# Patient Record
Sex: FEMALE | Race: ASIAN
[De-identification: names, ages, dates, MRNs, and addresses within clinical notes are randomized per-mention and may not be internally consistent; named-entity substitution may affect disease eponyms.]

---

## 2019-11-21 ENCOUNTER — HOSPITAL ENCOUNTER (EMERGENCY)
Dept: HOSPITAL 25 - UCEAST | Age: 59
Discharge: HOME | End: 2019-11-21
Payer: COMMERCIAL

## 2019-11-21 VITALS — DIASTOLIC BLOOD PRESSURE: 70 MMHG | SYSTOLIC BLOOD PRESSURE: 111 MMHG

## 2019-11-21 DIAGNOSIS — Z85.3: ICD-10-CM

## 2019-11-21 DIAGNOSIS — Z88.2: ICD-10-CM

## 2019-11-21 DIAGNOSIS — N30.00: Primary | ICD-10-CM

## 2019-11-21 PROCEDURE — G0463 HOSPITAL OUTPT CLINIC VISIT: HCPCS

## 2019-11-21 PROCEDURE — 87086 URINE CULTURE/COLONY COUNT: CPT

## 2019-11-21 PROCEDURE — 99212 OFFICE O/P EST SF 10 MIN: CPT

## 2019-11-21 PROCEDURE — 81003 URINALYSIS AUTO W/O SCOPE: CPT

## 2019-11-21 PROCEDURE — 87077 CULTURE AEROBIC IDENTIFY: CPT

## 2019-11-21 PROCEDURE — 87186 SC STD MICRODIL/AGAR DIL: CPT

## 2019-11-21 NOTE — UC
Complaint Female HPI





- HPI Summary


HPI Summary: 





60 yo femalebwith the onset of dysuria/urgency and frequency since last pm





hx UTIs





no back pain


no n/v








- History Of Current Complaint


Chief Complaint: UCGU


Stated Complaint: URINARY ISSUE


Time Seen by Provider: 11/21/19 08:36


Hx Obtained From: Patient


Hx Last Menstrual Period: 1.5 weeks ago


Onset/Duration: Gradual Onset, Lasting Hours


Timing: Intermittent, Lasting Seconds


Severity Initially: Moderate


Severity Currently: None


Pain Intensity: 5 - when voiding


Pain Scale Used: 0-10 Numeric


Character: Burning


Aggravating Factor(s): Urination


Alleviating Factor(s): Nothing


Associated Signs And Symptoms: Positive: Negative


Related Hx: Similar Episode/Dx as: - UTI





- Allergies/Home Medications


Allergies/Adverse Reactions: 


 Allergies











Allergy/AdvReac Type Severity Reaction Status Date / Time


 


Sulfa (Sulfonamide Allergy  Hives Verified 11/21/19 08:26





Antibiotics)     











Home Medications: 


 Home Medications





Estradiol/Progesterone [Bijuva 1 mg-100 mg Capsule] 1 each PO DAILY 11/21/19 [

History Confirmed 11/21/19]











PMH/Surg Hx/FS Hx/Imm Hx


Previously Healthy: Yes


Cancer History: Breast Cancer





- Surgical History


Surgical History: Yes


Surgery Procedure, Year, and Place: dermoid cyst removal from ovaries 

bilaterally, bilat mastectomy





- Family History


Known Family History: Positive: Hypertension





- Social History


Alcohol Use: Occasionally


Substance Use Type: None


Smoking Status (MU): Never Smoked Tobacco





Review of Systems


All Other Systems Reviewed And Are Negative: Yes


Constitutional: Positive: Negative


Skin: Positive: Negative


Eyes: Positive: Negative


ENT: Positive: Negative


Respiratory: Positive: Negative


Cardiovascular: Positive: Negative


Gastrointestinal: Positive: Negative


Genitourinary: Positive: Dysuria, Frequency, Urgency


Motor: Positive: Negative


Neurovascular: Positive: Negative


Musculoskeletal: Positive: Negative


Neurological: Positive: Negative


Psychological: Positive: Negative





Physical Exam


Triage Information Reviewed: Yes


Appearance: Well-Appearing, No Pain Distress, Well-Nourished


Vital Signs: 


 Initial Vital Signs











Temp  97.1 F   11/21/19 08:21


 


Pulse  61   11/21/19 08:21


 


Resp  18   11/21/19 08:21


 


BP  111/70   11/21/19 08:21


 


Pulse Ox  97   11/21/19 08:21











Vital Signs Reviewed: Yes


Eyes: Positive: Conjunctiva Clear


ENT: Positive: Hearing grossly normal.  Negative: Nasal congestion, Nasal 

drainage, Trismus, Muffled voice, Hoarse voice


Dental Exam: Normal


Neck: Positive: Supple, Nontender, No Lymphadenopathy


Respiratory: Positive: Lungs clear, Normal breath sounds, No respiratory 

distress, No accessory muscle use


Cardiovascular: Positive: RRR, No Murmur


Abdomen Description: Positive: Nontender.  Negative: CVA Tenderness (R), CVA 

Tenderness (L)


Bowel Sounds: Positive: Present


Musculoskeletal: Positive: ROM Intact, No Edema


Neurological Exam: Normal


Neurological: Positive: Alert


Psychological Exam: Normal





Diagnostics





- Laboratory


Lab Results: 





UA  ++ leuks, ++ RBCs





 Complaint Female Dx





- Differential Dx/Diagnosis


Provider Diagnosis: 


 Acute cystitis








Discharge ED





- Sign-Out/Discharge


Documenting (check all that apply): Patient Departure


All imaging exams completed and their final reports reviewed: No Studies





- Discharge Plan


Condition: Stable


Disposition: HOME


Prescriptions: 


Cephalexin CAP* [Keflex CAP*] 500 mg PO BID #10 cap


Patient Education Materials:  Urinary Tract Infection in Women (ED)


Referrals: 


Pantera Roy MD [Primary Care Provider] - 4 Days (if not better)





- Billing Disposition and Condition


Condition: STABLE


Disposition: Home

## 2019-11-21 NOTE — XMS REPORT
Continuity of Care Document (CCD)

 Created on:2019



Patient:Laine Peterson

Sex:Female

:1960

External Reference #:[id number]





Demographics







 Address  [address] [place] [address]

 

 Mobile Phone  [phone]

 

 Work Phone  [phone]

 

 Email Address  [email or web address]

 

 Preferred Language  en

 

 Marital Status  Declined to Specify/Unknown

 

 Confucianism Affiliation  Unknown

 

 Race  

 

 Ethnic Group  Declined to Specify/Unknown









Author







 Name  Lorene Christianson N.P. (transmitted by agent of provider Rita [place])

 

 Address  [facility], [place] [address]









Care Team Providers







 Name  Role  Phone

 

 Pantera Roy MD - Endocrinology,  Care Team Information   [phone]
[phone]



 Diabetes & Metabolism    

 

 Manuela Quiroga MD -  Care Team Information   [phone]



 Dermatology    









Problems







 Active Problems  Provider  Date

 

 Allergic rhinitis  Kathie Segovia MD  Onset: 2015

 

 Respiratory Conditions Due To Other Specified  Kathie Segovia MD  Onset: 



 External Agent    

 

 H/O: asbestos exposure  Kathie Segovia MD  Onset: 2015







Social History







 Type  Date  Description  Comments

 

 Birth Sex    Unknown  

 

 Cigarette Use    Quit 3 Years Ago  

 

 Tobacco Use  Start: Unknown End:  Former Cigarette Smoker  



   Unknown    

 

 Smoking Status  Reviewed: 10/28/19  Former Cigarette Smoker  

 

 ETOH Use    Occasionally consumes  



     alcohol  

 

 Tobacco Use  Start: Unknown End:  Patient is a former smoker  



   Unknown    

 

 Recreational Drug Use    Never Used Drugs  

 

 Tobacco Use  Start: Unknown  Light tobacco smoker (10 or  



     fewer cigarettes/day)  

 

 Exercise Type/Frequency    Exercises regularly  







Allergies, Adverse Reactions, Alerts







 Active Allergies  Reaction  Severity  Comments  Date

 

 Sulfa Antibiotics        2015







Medications







 Active Medications  SIG  Qnty  Indications  Ordering  Date



         Provider  

 

 Estradiol  Take one tab po  30tabs    Lorene Christianson,  10/28/2019



          1mg Tablets  daily      N.P.  



           

 

 Testosterone Cream  Apply to thigh      Lorene Christianson,  2019



 5MG  daily      N.P.  

 

 Intrarosa  insert one vaginal  28units    Lorene Christianson,  2019



          6.5mg  suppository nightly      N.P.  



 Insert          



           

 

 Progesterone  1 by mouth every  30caps    Lorene Christianson,  2019



 Micronized  day      N.P.  



           100mg          



 Capsules          



           

 

 Trimethoprim  1 tab by mouth prn      Pantera Roy MD  



             100mg  after intercourse        



 Tablets          



           

 

 Soolantra  once a day to      Junaid,  



          1% Cream  timo Roy MD  



           

 

 Ibuprofen  2 tabs by mouth      Unknown  



          200mg  every 6 hours as        



 Capsules  needed        



           









 History Medications









 Estradiol  apply one patch  1units    Lorene Christianson, N.P.  2019 -



       0.075mg/24HR  weekly        10/28/2019



 Patches Weekly          



           

 

 Climara  place one patch  4units    Lorene Christianson, N.P.  2019 -



     0.05mg/24HR Patches  td weekly        10/28/2019



 Weekly          

 

 Climara  Apply one patch  4units    Lorene Christianson, N.P.  2019 -



     0.0375mg/24HR  weekly        2019



 Patches Weekly          



           

 

 No Active Medications        Unknown  2019 -



           2019







Medications Administered in Office







 Medication  SIG  Qnty  Indications  Ordering Provider  Date

 

 Triamcinolone (Kenalog)        Summer Long PA-C  2019



                Injection          



           







Immunizations







 Description

 

 No Information Available







Vital Signs







 Date  Vital  Result  Comment

 

 10/28/2019  8:41am  Height  66 inches  5'6"









 Weight  153.00 lb  

 

 Heart Rate  72 /min  

 

 BP Systolic  96 mmHg  

 

 BP Diastolic  62 mmHg  

 

 O2 % BldC Oximetry  97 %  

 

 BMI (Body Mass Index)  24.7 kg/m2  

 

 Last Menstrual Period  [id number]  









 2019  9:19am  Height  66 inches  5'6"









 Weight  150.00 lb  

 

 Heart Rate  78 /min  

 

 BP Systolic  124 mmHg  

 

 BP Diastolic  71 mmHg  

 

 Body Temperature  96.6 F  

 

 Pain Level  3  

 

 BMI (Body Mass Index)  24.2 kg/m2  







Results







 Test  Date  Facility  Test  Result  H/L  Range  Note

 

 Laboratory test  2019  [facility]  Estradiol  <40 pg/mL      1



 finding    [address]          



     [place] [address] [phone]          

 

 Free Estradiol  2019  [facility]  Percent Free  2.3 %      2



     [address]  Estradiol        



     [place] [address] [phone]          









 Free Estradiol  0.35 pg/mL  Abnormal    3

 

 Sex Hormone Binding Globulin  35.6 nmol/L      4

 

 Total Estradiol  15 pg/mL      5









 Laboratory test finding  2019  [facility]  Progesterone  1.6 
ng/mL      6



     [address]          



     [place] [address] [phone]          









 TSH (Thyroid Stim Horm)  1.74 mcIU/mL  Normal  0.34-5.60  

 

 Estradiol, Confirmatory, S  16 pg/mL      7









 Laboratory test  2019  [facility]  Testosterone  < 10.00  
Normal  8-60  



 finding    [address]  Total  ng/dL      



     [place] [address] [phone]          









 Sex Hormone Binding Globulin  34 nmol/L      8









 1  Estradiols <40 pg/mL are sent to a reference lab for low



   range testing.



   Postmenopausal Females                  < 20



   Ovulating females:  by day in cycle relative to LH Peak



   Follicular phase   - 12                10-50



   -  4                



   Mid-cycle          -  1               120-375



   Luteal phase       +  2                



   +  6                



   + 12                

 

 2  Reference Range:



   Adult Females: 1.6 - 3.6

 

 3  Reference Range:



   Adult Females: 0.6 - 7.1

 

 4  Reference Range:



   >49y: 17.3 - 125.0



   Test Performed by:



   [facility]



   [address] [place], [place] [id number]

 

 5  Reference Range:



   Adult Females



   Follicular: 30 - 100



   Luteal: 70 - 300



   Postmenopausal: <15

 

 6  Female reference ranges for Progesterone:



   Follicular phase.......0.3 - 1.5 ng/ml



   Mid-luteal phase.......5.2 - 18.5 ng/ml



   Postmenopausal.........< 0.8 ng/ml



   Pregnant



   1st trimester.........4.7 - 50.0 ng/ml



   2nd trimester.........19.4 - 45.3 ng/ml

 

 7  -------------------REFERENCE VALUE--------------------------



   Premenopausal:  (E2 levels vary widely through the



   menstrual cycle.)



   Postmenopausal: <10



   -------------------ADDITIONAL INFORMATION-------------------



   This test was developed and its performance characteristics



   determined by [facility] in a manner consistent with CLIA



   requirements. This test has not been cleared or approved by



   the U.S. Food and Drug Administration.



   Test Performed by:



   [facility] - [address] [address]



   : Surendra Tolbert M.D. Ph.D.; CLIA# [id number]

 

 8  -------------------REFERENCE VALUE--------------------------



    (non-pregnant)



   Test Performed by:



   [facility] - [address] [address]







Procedures







 Date  Code  Description  Status

 

 2019  31951  Inject/Drain Joint/Bursa Major W/O US  Completed

 

 2018  51254909  Colonoscopy  Completed







Medical Devices







 Description

 

 No Information Available







Encounters







 Type  Date  Location  Provider  Dx  Diagnosis

 

 Office Visit  2019  [facility]  Teressa Hernandez MD  M25.511  Pain 
in right



   9:15a  at [facility]









 M75.41  Impingement syndrome of right shoulder









 Office Visit  2019  3:40p  [facility]  Lorene Christianson  R68.82  Decreased 
libido



     Clinic [facility]  N.P.    









 R10.2  Pelvic and perineal pain

 

 N95.1  Menopausal and female climacteric states









 Office Visit  2019  9:00a  [facility]  Teressa Hernandez  M25.511  
Pain in right



     at [facility]    shoulder









 M75.41  Impingement syndrome of right shoulder









 Office Visit  2019  9:30a  [facility]  Nurse Visit  Z85.3  Personal 
history of



     Clinic of [facility]    malignant neoplasm



           of breast

 

 Office Visit  2019 10:00a  [facility]  Lorene Christianson  N95.1  Menopausal 
and



     Clinic of [facility]  N.P.    female climacteric



           states









 G47.00  Insomnia, unspecified







Assessments







 Date  Code  Description  Provider

 

 10/28/2019  R68.82  Decreased libido  Lorene Christianson N.P.

 

 2019  M25.511  Pain in right shoulder  Teressa Hernandez MD

 

 2019  M75.41  Impingement syndrome of right shoulder  Teressa Hernandez MD

 

 2019  R68.82  Decreased libido  Lorene Christianson N.P.

 

 2019  R10.2  Pelvic and perineal pain  Lorene Christianson N.P.

 

 2019  N95.1  Menopausal and female climacteric states  Lorene Christianson N.P.

 

 2019  M25.511  Pain in right shoulder  Summer Long PA-C

 

 2019  M25.511  Pain in right shoulder  Teressa Hernandez MD

 

 2019  M75.41  Impingement syndrome of right shoulder  Teressa Hernandez MD

 

 2019  Z85.3  Personal history of malignant neoplasm  Nurse Visit [facility]  

 

 2019  N95.1  Menopausal and female climacteric states  Lorene Christianson N.P.

 

 2019  G47.00  Insomnia, unspecified  Lorene Christianson N.P.







Plan of Treatment

Future Appointment(s):2019  3:40 pm - Lorene Christianson N.P. at [facility]10/ - Lorene Christianson N.P.R68.82 Decreased libidoComments:D 
mannose - consider for UTI prophylaxisCheck testosterone



Functional Status







 Description

 

 No Information Available







Mental Status







 Description

 

 No Information Available







Referrals







 Description

 

 No Information Available

## 2019-12-27 ENCOUNTER — HOSPITAL ENCOUNTER (EMERGENCY)
Dept: HOSPITAL 25 - UCEAST | Age: 59
Discharge: HOME | End: 2019-12-27
Payer: COMMERCIAL

## 2019-12-27 VITALS — DIASTOLIC BLOOD PRESSURE: 75 MMHG | SYSTOLIC BLOOD PRESSURE: 130 MMHG

## 2019-12-27 DIAGNOSIS — Z88.2: ICD-10-CM

## 2019-12-27 DIAGNOSIS — N39.0: Primary | ICD-10-CM

## 2019-12-27 PROCEDURE — 87086 URINE CULTURE/COLONY COUNT: CPT

## 2019-12-27 PROCEDURE — 81003 URINALYSIS AUTO W/O SCOPE: CPT

## 2019-12-27 PROCEDURE — G0463 HOSPITAL OUTPT CLINIC VISIT: HCPCS

## 2019-12-27 PROCEDURE — 96372 THER/PROPH/DIAG INJ SC/IM: CPT

## 2019-12-27 PROCEDURE — 99212 OFFICE O/P EST SF 10 MIN: CPT

## 2019-12-27 NOTE — UC
Complaint Female HPI





- HPI Summary


HPI Summary: 





60 yo female presents with UTI symptoms. She tells me that about 1.5 hours ago 

she had a sudden onset of burning with urination, bladder pressure, and urinary 

frequency. She has had UTIs in the past and this feels the same, but has never 

had one come on so suddenly. She denies fever, chills, abdominal pain, n/v, 

flank pain, back pain. No vaginal bleeding or discharge. Has never had a kidney 

stone in the past





- History Of Current Complaint


Stated Complaint: BURNING URINATION


Time Seen by Provider: 12/27/19 21:01


Hx Obtained From: Patient


Hx Last Menstrual Period: 1.5 weeks ago


Onset/Duration: Sudden Onset


Severity Initially: Mild


Severity Currently: Mild


Pain Intensity: 3


Pain Scale Used: 0-10 Numeric





- Allergies/Home Medications


Allergies/Adverse Reactions: 


 Allergies











Allergy/AdvReac Type Severity Reaction Status Date / Time


 


Sulfa (Sulfonamide Allergy  Hives Verified 12/27/19 21:08





Antibiotics)     











Home Medications: 


 Home Medications





Vaginal Insert For Lubrication 1 dose VAGINAL DAILY 12/27/19 [History Confirmed 

12/27/19]











PMH/Surg Hx/FS Hx/Imm Hx





- Additional Past Medical History


Additional PMH: 





None





- Surgical History


Surgical History: Yes


Surgery Procedure, Year, and Place: dermoid cyst removal from ovaries 

bilaterally, bilat mastectomy





- Family History


Known Family History: Positive: Hypertension





- Social History


Lives: With Family


Alcohol Use: Occasionally


Substance Use Type: None


Smoking Status (MU): Never Smoked Tobacco





Review of Systems


All Other Systems Reviewed And Are Negative: No


Constitutional: Positive: Negative


Skin: Positive: Negative


Respiratory: Positive: Negative


Cardiovascular: Positive: Negative


Gastrointestinal: Positive: Negative


Genitourinary: Positive: Dysuria


Neurological: Positive: Negative


Psychological: Positive: Negative





Physical Exam





- Summary


Physical Exam Summary: 





GENERAL: NAD. WDWN. No pain distress.


SKIN: No rashes, sores, lesions, or open wounds.


NECK: Supple. Nontender. No lymphadenopathy. 


CHEST:  CTAB. No r/r/w. No accessory muscle use. Breathing comfortably and in 

no distress.


CV:  RRR. Pulses intact. Cap refill <2seconds


ABDOMEN:  Soft. Mild ttp overlying bladder. No CVA tenderness. Bowel sounds 

present


NEURO: Alert. 


PSYCH: Age appropriate behavior.


Triage Information Reviewed: Yes


Vital Signs: 





Vital Signs:











Temp Pulse Resp BP Pulse Ox


 


 98 F   79   16   130/75   98 


 


 12/27/19 20:59  12/27/19 20:59  12/27/19 20:59  12/27/19 20:59  12/27/19 20:59








 Laboratory Tests











  12/27/19





  21:05


 


POC Urine Color  Kelly


 


POC Urine Clarity  Cloudy


 


POC Urine pH  5.5


 


POC Ur Specif Gravity  >= 1.030


 


POC Urine Protein  3+ A


 


POC Ur Glucose (UA)  Trace


 


POC Urine Ketones  Trace


 


POC Urine Blood  3+ A


 


POC Urine Nitrite  Negative


 


POC Urine Bilirubin  1+ A


 


POC Urine Urobilinogen  0.2


 


POC U Leukocyte Esteras  1+ A











Vital Signs Reviewed: Yes





 Complaint Female Dx





- Course


Course Of Treatment: 





UA as above.


Low suspicion for stone at this time as she has no flank or back pain and no hx 

of stones. Discomfort is mostly during urination and pressure overlying 

bladder. 


In the clinic she was given toradol IM for her inflammation, keflex and 

pyridium for her UTI. 








- Differential Dx/Diagnosis


Provider Diagnosis: 


 UTI (urinary tract infection)








Discharge ED





- Sign-Out/Discharge


Documenting (check all that apply): Patient Departure


All imaging exams completed and their final reports reviewed: No Studies





- Discharge Plan


Condition: Stable


Disposition: HOME


Prescriptions: 


Cephalexin CAP* [Keflex CAP*] 500 mg PO BID #10 cap


Phenazopyridine 200 mg (NF) [Pyridium 200 MG tab *] 200 mg PO TID #6 tab


Patient Education Materials:  Urinary Tract Infection in Women (ED)


Referrals: 


Pantera Roy MD [Primary Care Provider] - 


Additional Instructions: 


If you develop a fever, shortness of breath, chest pain, new or worsening 

symptoms - please call your PCP or go to the ED immediately.


 








- Billing Disposition and Condition


Condition: STABLE


Disposition: Home

## 2019-12-29 NOTE — UC
- Progress Note


Progress Note: 


call patient to assure sx have resolved --if not follow up as culture does not 

indicate UTI








Course/Dx





- Diagnoses


Provider Diagnoses: 


 UTI (urinary tract infection)








Discharge ED





- Sign-Out/Discharge


Documenting (check all that apply): Post-Discharge Follow Up


All imaging exams completed and their final reports reviewed: No Studies





- Discharge Plan


Condition: Stable


Disposition: HOME


Prescriptions: 


Cephalexin CAP* [Keflex CAP*] 500 mg PO BID #10 cap


Phenazopyridine 200 mg (NF) [Pyridium 200 MG tab *] 200 mg PO TID #6 tab


Patient Education Materials:  Urinary Tract Infection in Women (ED)


Referrals: 


Pantera Roy MD [Primary Care Provider] - 


Additional Instructions: 


If you develop a fever, shortness of breath, chest pain, new or worsening 

symptoms - please call your PCP or go to the ED immediately.


 








- Billing Disposition and Condition


Condition: STABLE


Disposition: Home

## 2020-01-13 ENCOUNTER — HOSPITAL ENCOUNTER (EMERGENCY)
Dept: HOSPITAL 25 - UCEAST | Age: 60
Discharge: HOME | End: 2020-01-13
Payer: COMMERCIAL

## 2020-01-13 VITALS — DIASTOLIC BLOOD PRESSURE: 57 MMHG | SYSTOLIC BLOOD PRESSURE: 101 MMHG

## 2020-01-13 DIAGNOSIS — N39.0: Primary | ICD-10-CM

## 2020-01-13 DIAGNOSIS — R31.9: ICD-10-CM

## 2020-01-13 DIAGNOSIS — Z88.2: ICD-10-CM

## 2020-01-13 PROCEDURE — G0463 HOSPITAL OUTPT CLINIC VISIT: HCPCS

## 2020-01-13 PROCEDURE — 81003 URINALYSIS AUTO W/O SCOPE: CPT

## 2020-01-13 PROCEDURE — 99212 OFFICE O/P EST SF 10 MIN: CPT

## 2020-01-13 PROCEDURE — 87086 URINE CULTURE/COLONY COUNT: CPT

## 2020-01-13 NOTE — UC
Complaint Female HPI





- HPI Summary


HPI Summary: 





She woke up at 4 AM this morning with typical symptoms of a UTI for her.  She 

has frequency, urgency and dysuria.  She also has some mild hematuria which is 

unusual for her.  She has had that once before which was about 6 days ago.  At 

that time she also had very sudden onset of symptomatology.  She was given 

antibiotics and Pyridium but the culture came back negative.  She has frequent 

urinary tract infections but has never seen a neurologist.  She takes 

prophylactic trimethoprim after intercourse.  Both the episode 6 days ago and 

the one today are consistent with the timing of having had intercourse.  She's 

never had a kidney stone and has no flank pain at this time.





- History Of Current Complaint


Chief Complaint: UCGU


Stated Complaint: PAIN WHEN URINATING


Time Seen by Provider: 01/13/20 07:47


Hx Last Menstrual Period: 1.5 weeks ago


Onset/Duration: Sudden Onset


Timing: Constant


Severity Initially: Moderate


Severity Currently: Severe


Pain Intensity: 8


Character: Burning


Aggravating Factor(s): Urination


Alleviating Factor(s): Nothing


Associated Signs And Symptoms: Negative: Fever, Back Pain, Nausea, Vomiting(# 

Of Episodes =)





- Allergies/Home Medications


Allergies/Adverse Reactions: 


 Allergies











Allergy/AdvReac Type Severity Reaction Status Date / Time


 


Sulfa (Sulfonamide Allergy  Hives Verified 01/13/20 07:44





Antibiotics)     











Home Medications: 


 Home Medications





Ibuprofen 400 mg PO ONCE PRN 01/13/20 [History Confirmed 01/13/20]


Testosterone 1 gm TOPICAL DAILY 01/13/20 [History Confirmed 01/13/20]











PMH/Surg Hx/FS Hx/Imm Hx


Previously Healthy: Yes





- Surgical History


Surgical History: Yes


Surgery Procedure, Year, and Place: dermoid cyst removal from ovaries 

bilaterally, bilat mastectomy





- Family History


Known Family History: Positive: Hypertension





- Social History


Alcohol Use: Occasionally


Substance Use Type: None


Smoking Status (MU): Never Smoked Tobacco





Review of Systems


All Other Systems Reviewed And Are Negative: Yes


Constitutional: Positive: Negative


Skin: Positive: Negative


Gastrointestinal: Positive: Abdominal Pain - Suprapubic


Genitourinary: Positive: Dysuria, Hematuria, Frequency, Urgency


Is Patient Immunocompromised?: No





Physical Exam





- Summary


Physical Exam Summary: 





She is nontoxic in appearance with stable vital signs


Triage Information Reviewed: Yes


Appearance: Well-Appearing, No Pain Distress


Vital Signs: 


 Initial Vital Signs











Temp  98 F   01/13/20 07:34


 


Pulse  81   01/13/20 07:34


 


Resp  18   01/13/20 07:34


 


BP  101/57   01/13/20 07:34


 


Pulse Ox  97   01/13/20 07:34











Vital Signs Reviewed: Yes


Respiratory Exam: Normal


Cardiovascular Exam: Normal


Abdomen Description: Positive: Other: - Mild suprapubic tenderness.  Negative: 

CVA Tenderness (R), CVA Tenderness (L)


Skin Exam: Normal





 Complaint Female Dx





- Course


Course Of Treatment: 





Her UA shows red blood cells and leukocyte esterase.  This may very well the 

UTI and I think it's important to treat that way until culture results are 

available.  She may just have hematuria again which is causing her dysuria.  I 

recommended she follow up with her PCP to consider urology referral if she 

continues to have episodes of hematuria.  She has no symptoms consistent with a 

kidney stone at this time.





- Differential Dx/Diagnosis


Provider Diagnosis: 


 UTI (urinary tract infection), Hematuria








Discharge ED





- Sign-Out/Discharge


Documenting (check all that apply): Patient Departure


All imaging exams completed and their final reports reviewed: No Studies





- Discharge Plan


Condition: Stable


Disposition: HOME


Patient Education Materials:  Urinary Tract Infection in Women (ED), Hematuria (

ED)


Referrals: 


Pantera Roy MD [Primary Care Provider] - 





- Billing Disposition and Condition


Condition: STABLE


Disposition: Home

## 2020-01-14 NOTE — UC
- Progress Note


Progress Note: 





urine culture final - no growth


no change


[facility] 








Course/Dx





- Diagnoses


Provider Diagnoses: 


 UTI (urinary tract infection), Hematuria








Discharge ED





- Sign-Out/Discharge


Documenting (check all that apply): Post-Discharge Follow Up


All imaging exams completed and their final reports reviewed: No Studies





- Discharge Plan


Condition: Stable


Disposition: HOME


Prescriptions: 


Nitrofurantoin Monohyd/M-Cryst [Macrobid 100 mg Capsule] 100 mg PO BID #10 cap


Patient Education Materials:  Urinary Tract Infection in Women (ED), Hematuria (

ED)


Referrals: 


Pantera Roy MD [Primary Care Provider] - 





- Billing Disposition and Condition


Condition: STABLE


Disposition: Home